# Patient Record
Sex: FEMALE | Race: WHITE | URBAN - NONMETROPOLITAN AREA
[De-identification: names, ages, dates, MRNs, and addresses within clinical notes are randomized per-mention and may not be internally consistent; named-entity substitution may affect disease eponyms.]

---

## 2023-03-08 ENCOUNTER — OFFICE VISIT (OUTPATIENT)
Dept: URBAN - NONMETROPOLITAN AREA CLINIC 2 | Facility: CLINIC | Age: 73
End: 2023-03-08
Payer: MEDICARE

## 2023-03-08 ENCOUNTER — WEB ENCOUNTER (OUTPATIENT)
Dept: URBAN - NONMETROPOLITAN AREA CLINIC 2 | Facility: CLINIC | Age: 73
End: 2023-03-08

## 2023-03-08 ENCOUNTER — LAB OUTSIDE AN ENCOUNTER (OUTPATIENT)
Dept: URBAN - NONMETROPOLITAN AREA CLINIC 2 | Facility: CLINIC | Age: 73
End: 2023-03-08

## 2023-03-08 VITALS
HEIGHT: 59 IN | WEIGHT: 130 LBS | DIASTOLIC BLOOD PRESSURE: 83 MMHG | SYSTOLIC BLOOD PRESSURE: 138 MMHG | BODY MASS INDEX: 26.21 KG/M2 | HEART RATE: 92 BPM

## 2023-03-08 DIAGNOSIS — K76.0 FATTY LIVER: ICD-10-CM

## 2023-03-08 DIAGNOSIS — Z12.11 SCREENING FOR COLON CANCER: ICD-10-CM

## 2023-03-08 DIAGNOSIS — K80.20 GALLSTONES: ICD-10-CM

## 2023-03-08 PROCEDURE — 99203 OFFICE O/P NEW LOW 30 MIN: CPT

## 2023-03-08 RX ORDER — POLYETHYLENE GLYCOL 3350, SODIUM SULFATE, SODIUM CHLORIDE, POTASSIUM CHLORIDE, ASCORBIC ACID, SODIUM ASCORBATE 140-9-5.2G
AS DIRECTED KIT ORAL ONCE
Qty: 1 KIT | Refills: 0 | OUTPATIENT
Start: 2023-03-08 | End: 2023-03-09

## 2023-03-08 NOTE — PHYSICAL EXAM NEUROLOGIC:
Requested Prescriptions     Pending Prescriptions Disp Refills    rivaroxaban (XARELTO) 20 mg tab tablet 30 Tab 3     Sig: Take 1 Tab by mouth daily. oriented to person, place and time ,  , cranial nerves 2-12 grossly intact

## 2023-03-08 NOTE — HPI-TODAY'S VISIT:
3/8/2023 Mrs. Cavazos is a 71 yo female who was referred to our office for evaluation for repeat colonoscopy by Dr. Paramjit Newton. A copy of this note and recommendations will be sent to the referring provider's office. Patient denies nausea, vomiting, abdominal pain, heartburn, diarrhea, melena, hematochezia, anemia and unintentional weight loss. Denies a change in bowel habits and bowel movements are reported normal and daily. BMs possibly 2-3 times a day.  Denies history of smoking, tobacco use or drug use. Denies a family history of colon cancer, but mother with polyps at young age.  Patient states regular colonoscopy with most recent 2010 with no polyps. History of fatty liver. Lost to follow up with COVID disruption.  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 SP Statement Selected

## 2023-03-09 LAB
ABSOLUTE BASOPHILS: 67
ABSOLUTE EOSINOPHILS: 146
ABSOLUTE LYMPHOCYTES: 2208
ABSOLUTE MONOCYTES: 421
ABSOLUTE NEUTROPHILS: 3257
ALBUMIN/GLOBULIN RATIO: 1.7
ALBUMIN: 4.2
ALKALINE PHOSPHATASE: 48
ALT (SGPT): 15
AST (SGOT): 13
BASOPHILS: 1.1
BILIRUBIN, DIRECT: 0.1
BILIRUBIN, INDIRECT: 0.4
BILIRUBIN, TOTAL: 0.5
BUN/CREATININE RATIO: (no result)
CALCIUM: 9.4
CARBON DIOXIDE: 27
CHLORIDE: 106
CREATININE: 0.9
EGFR: 68
EOSINOPHILS: 2.4
GLOBULIN: 2.5
GLUCOSE: 80
HEMATOCRIT: 39.9
HEMOGLOBIN: 14.3
LYMPHOCYTES: 36.2
MCH: 34
MCHC: 35.8
MCV: 94.8
MONOCYTES: 6.9
MPV: 10
NEUTROPHILS: 53.4
PLATELET COUNT: 218
POTASSIUM: 4.5
PROTEIN, TOTAL: 6.7
RDW: 12.4
RED BLOOD CELL COUNT: 4.21
SODIUM: 140
UREA NITROGEN (BUN): 13
WHITE BLOOD CELL COUNT: 6.1

## 2023-03-17 ENCOUNTER — TELEPHONE ENCOUNTER (OUTPATIENT)
Dept: URBAN - METROPOLITAN AREA CLINIC 35 | Facility: CLINIC | Age: 73
End: 2023-03-17

## 2023-03-17 RX ORDER — POLYETHYLENE GLYCOL 3350, SODIUM SULFATE, SODIUM CHLORIDE, POTASSIUM CHLORIDE, ASCORBIC ACID, SODIUM ASCORBATE 140-9-5.2G
1000 ML KIT ORAL ONCE
Qty: 1 KIT | Refills: 0 | OUTPATIENT
Start: 2023-03-22 | End: 2023-03-23

## 2023-04-11 ENCOUNTER — TELEPHONE ENCOUNTER (OUTPATIENT)
Dept: URBAN - METROPOLITAN AREA CLINIC 35 | Facility: CLINIC | Age: 73
End: 2023-04-11

## 2023-06-16 ENCOUNTER — OFFICE VISIT (OUTPATIENT)
Dept: URBAN - NONMETROPOLITAN AREA SURGERY CENTER 1 | Facility: SURGERY CENTER | Age: 73
End: 2023-06-16
Payer: MEDICARE

## 2023-06-16 DIAGNOSIS — Z80.0 BROTHER AT YOUNG AGE FAMILY HISTORY OF COLON CANCER: ICD-10-CM

## 2023-06-16 DIAGNOSIS — Z12.11 COLON CANCER SCREENING: ICD-10-CM

## 2023-06-16 PROCEDURE — G8907 PT DOC NO EVENTS ON DISCHARG: HCPCS | Performed by: INTERNAL MEDICINE

## 2023-06-16 PROCEDURE — G0105 COLORECTAL SCRN; HI RISK IND: HCPCS | Performed by: INTERNAL MEDICINE

## 2023-09-05 ENCOUNTER — OFFICE VISIT (OUTPATIENT)
Dept: URBAN - NONMETROPOLITAN AREA CLINIC 2 | Facility: CLINIC | Age: 73
End: 2023-09-05
Payer: MEDICARE

## 2023-09-05 VITALS
HEART RATE: 75 BPM | DIASTOLIC BLOOD PRESSURE: 75 MMHG | BODY MASS INDEX: 26.85 KG/M2 | WEIGHT: 133.2 LBS | HEIGHT: 59 IN | SYSTOLIC BLOOD PRESSURE: 131 MMHG

## 2023-09-05 DIAGNOSIS — K80.20 GALLSTONES: ICD-10-CM

## 2023-09-05 DIAGNOSIS — K76.0 FATTY LIVER: ICD-10-CM

## 2023-09-05 DIAGNOSIS — Z78.9 MEDICATION INTOLERANCE: ICD-10-CM

## 2023-09-05 DIAGNOSIS — Z12.11 SCREENING FOR COLON CANCER: ICD-10-CM

## 2023-09-05 PROBLEM — 235919008: Status: ACTIVE | Noted: 2023-03-08

## 2023-09-05 PROBLEM — 59037007: Status: ACTIVE | Noted: 2023-09-05

## 2023-09-05 PROBLEM — 305058001: Status: ACTIVE | Noted: 2023-03-14

## 2023-09-05 PROCEDURE — 99213 OFFICE O/P EST LOW 20 MIN: CPT

## 2023-09-05 RX ORDER — VALACYCLOVIR HYDROCHLORIDE 1 G/1
TABLET, FILM COATED ORAL
Qty: 20 TABLET | Status: ACTIVE | COMMUNITY

## 2023-09-05 NOTE — HPI-TODAY'S VISIT:
3/8/2023 Mrs. Cavazos is a 71 yo female who was referred to our office for evaluation for repeat colonoscopy by Dr. Paramjit Newton. A copy of this note and recommendations will be sent to the referring provider's office. Patient denies nausea, vomiting, abdominal pain, heartburn, diarrhea, melena, hematochezia, anemia and unintentional weight loss. Denies a change in bowel habits and bowel movements are reported normal and daily. BMs possibly 2-3 times a day.  Denies history of smoking, tobacco use or drug use. Denies a family history of colon cancer, but mother with polyps at young age.  Patient states regular colonoscopy with most recent 2010 with no polyps. History of fatty liver. Lost to follow up with COVID disruption.  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 SP 9/5/2023 Ms. Cavazos returns to clinic after her colonoscopy with Dr. Cordoba which was normal, she is due to repeat in 5 years.  Today she describes a very negative reaction to Plenvu prep and would prefer to avoid this in the future.  She believes that left her dehydrated and very weak.  She has tolerated prep prior to this very well.  Today she has no GI symptom complaints.  We did review her new diagnosis of fatty liver on ultrasound imaging in April of this year.  She is doing more sedentary activities like crafting.  She will aim to lose 10 pounds and work on diet and exercise.  We will repeat her imaging next spring.  She has had normal liver enzymes on her lab work. SP

## 2023-09-06 ENCOUNTER — OFFICE VISIT (OUTPATIENT)
Dept: URBAN - NONMETROPOLITAN AREA CLINIC 2 | Facility: CLINIC | Age: 73
End: 2023-09-06

## 2024-03-29 ENCOUNTER — LAB (OUTPATIENT)
Dept: URBAN - NONMETROPOLITAN AREA CLINIC 2 | Facility: CLINIC | Age: 74
End: 2024-03-29

## 2024-05-06 ENCOUNTER — DASHBOARD ENCOUNTERS (OUTPATIENT)
Age: 74
End: 2024-05-06

## 2024-05-06 ENCOUNTER — OFFICE VISIT (OUTPATIENT)
Dept: URBAN - NONMETROPOLITAN AREA CLINIC 2 | Facility: CLINIC | Age: 74
End: 2024-05-06
Payer: MEDICARE

## 2024-05-06 VITALS
HEIGHT: 59 IN | DIASTOLIC BLOOD PRESSURE: 83 MMHG | HEART RATE: 75 BPM | SYSTOLIC BLOOD PRESSURE: 133 MMHG | TEMPERATURE: 98 F | WEIGHT: 134.7 LBS | BODY MASS INDEX: 27.16 KG/M2

## 2024-05-06 DIAGNOSIS — K80.20 GALLSTONES: ICD-10-CM

## 2024-05-06 DIAGNOSIS — K76.0 FATTY LIVER: ICD-10-CM

## 2024-05-06 DIAGNOSIS — Z12.11 SCREENING FOR COLON CANCER: ICD-10-CM

## 2024-05-06 DIAGNOSIS — Z78.9 MEDICATION INTOLERANCE: ICD-10-CM

## 2024-05-06 PROCEDURE — 99213 OFFICE O/P EST LOW 20 MIN: CPT

## 2024-05-06 RX ORDER — VALACYCLOVIR HYDROCHLORIDE 1 G/1
TABLET, FILM COATED ORAL
Qty: 20 TABLET | Status: ACTIVE | COMMUNITY

## 2025-05-05 ENCOUNTER — OFFICE VISIT (OUTPATIENT)
Dept: URBAN - NONMETROPOLITAN AREA CLINIC 2 | Facility: CLINIC | Age: 75
End: 2025-05-05